# Patient Record
Sex: MALE
[De-identification: names, ages, dates, MRNs, and addresses within clinical notes are randomized per-mention and may not be internally consistent; named-entity substitution may affect disease eponyms.]

---

## 2022-06-24 ENCOUNTER — APPOINTMENT (OUTPATIENT)
Dept: UROLOGY | Facility: CLINIC | Age: 57
End: 2022-06-24
Payer: COMMERCIAL

## 2022-06-24 VITALS
DIASTOLIC BLOOD PRESSURE: 83 MMHG | RESPIRATION RATE: 16 BRPM | BODY MASS INDEX: 22.9 KG/M2 | WEIGHT: 160 LBS | OXYGEN SATURATION: 98 % | HEART RATE: 97 BPM | HEIGHT: 70 IN | TEMPERATURE: 97.8 F | SYSTOLIC BLOOD PRESSURE: 128 MMHG

## 2022-06-24 DIAGNOSIS — R22.9 LOCALIZED SWELLING, MASS AND LUMP, UNSPECIFIED: ICD-10-CM

## 2022-06-24 DIAGNOSIS — C73 MALIGNANT NEOPLASM OF THYROID GLAND: ICD-10-CM

## 2022-06-24 DIAGNOSIS — Z78.9 OTHER SPECIFIED HEALTH STATUS: ICD-10-CM

## 2022-06-24 DIAGNOSIS — Z00.00 ENCOUNTER FOR GENERAL ADULT MEDICAL EXAMINATION W/OUT ABNORMAL FINDINGS: ICD-10-CM

## 2022-06-24 DIAGNOSIS — R68.89 OTHER GENERAL SYMPTOMS AND SIGNS: ICD-10-CM

## 2022-06-24 PROCEDURE — 99203 OFFICE O/P NEW LOW 30 MIN: CPT

## 2022-06-24 RX ORDER — TRIAMCINOLONE ACETONIDE 1 MG/G
0.1 CREAM TOPICAL
Qty: 30 | Refills: 0 | Status: ACTIVE | COMMUNITY
Start: 2021-11-30

## 2022-06-24 RX ORDER — BACITRACIN 500 [IU]/G
500 OINTMENT TOPICAL TWICE DAILY
Qty: 1 | Refills: 3 | Status: ACTIVE | COMMUNITY
Start: 2022-06-24 | End: 1900-01-01

## 2022-06-24 RX ORDER — PREDNISONE 20 MG/1
20 TABLET ORAL
Qty: 10 | Refills: 0 | Status: ACTIVE | COMMUNITY
Start: 2022-04-13

## 2022-06-24 RX ORDER — AMOXICILLIN 500 MG/1
500 CAPSULE ORAL
Qty: 21 | Refills: 0 | Status: ACTIVE | COMMUNITY
Start: 2022-04-01

## 2022-06-24 RX ORDER — PREDNISONE 50 MG/1
50 TABLET ORAL
Qty: 3 | Refills: 0 | Status: ACTIVE | COMMUNITY
Start: 2021-12-29

## 2022-06-24 RX ORDER — DIPHENHYDRAMINE HCL 25 MG/1
25 TABLET ORAL
Qty: 2 | Refills: 0 | Status: ACTIVE | COMMUNITY
Start: 2021-12-29

## 2022-06-24 RX ORDER — CHLORHEXIDINE GLUCONATE, 0.12% ORAL RINSE 1.2 MG/ML
0.12 SOLUTION DENTAL
Qty: 473 | Refills: 0 | Status: ACTIVE | COMMUNITY
Start: 2022-04-01

## 2022-06-24 RX ORDER — CLOBETASOL PROPIONATE 0.5 MG/G
0.05 CREAM TOPICAL
Qty: 60 | Refills: 0 | Status: ACTIVE | COMMUNITY
Start: 2022-04-19

## 2022-06-24 RX ORDER — AMMONIUM LACTATE 12 %
12 CREAM (GRAM) TOPICAL
Qty: 140 | Refills: 0 | Status: ACTIVE | COMMUNITY
Start: 2021-12-15

## 2022-06-25 LAB
APPEARANCE: CLEAR
BACTERIA UR CULT: NORMAL
BACTERIA: NEGATIVE
BILIRUBIN URINE: NEGATIVE
BLOOD URINE: NEGATIVE
C TRACH RRNA SPEC QL NAA+PROBE: NOT DETECTED
CALCIUM OXALATE CRYSTALS: ABNORMAL
COLOR: NORMAL
GLUCOSE QUALITATIVE U: NEGATIVE
HYALINE CASTS: 0 /LPF
KETONES URINE: NEGATIVE
LEUKOCYTE ESTERASE URINE: NEGATIVE
MICROSCOPIC-UA: NORMAL
N GONORRHOEA RRNA SPEC QL NAA+PROBE: NOT DETECTED
NITRITE URINE: NEGATIVE
PH URINE: 6.5
PROTEIN URINE: NEGATIVE
RED BLOOD CELLS URINE: 0 /HPF
SOURCE AMPLIFICATION: NORMAL
SPECIFIC GRAVITY URINE: 1.02
SQUAMOUS EPITHELIAL CELLS: 0 /HPF
UROBILINOGEN URINE: NORMAL
WHITE BLOOD CELLS URINE: 1 /HPF

## 2022-07-03 NOTE — ADDENDUM
[FreeTextEntry1] : Entered by Allie Beebe, acting as scribe for Dr. Kayode Pierce.\par \par The documentation recorded by the scribe accurately reflects the service I personally performed and the decisions made by me.